# Patient Record
Sex: FEMALE | Race: WHITE | NOT HISPANIC OR LATINO | Employment: PART TIME | URBAN - METROPOLITAN AREA
[De-identification: names, ages, dates, MRNs, and addresses within clinical notes are randomized per-mention and may not be internally consistent; named-entity substitution may affect disease eponyms.]

---

## 2022-05-09 ENCOUNTER — OFFICE VISIT (OUTPATIENT)
Dept: FAMILY MEDICINE CLINIC | Facility: CLINIC | Age: 47
End: 2022-05-09
Payer: COMMERCIAL

## 2022-05-09 VITALS
DIASTOLIC BLOOD PRESSURE: 70 MMHG | TEMPERATURE: 99.4 F | WEIGHT: 178.6 LBS | HEART RATE: 96 BPM | HEIGHT: 63 IN | BODY MASS INDEX: 31.64 KG/M2 | SYSTOLIC BLOOD PRESSURE: 122 MMHG | OXYGEN SATURATION: 98 %

## 2022-05-09 DIAGNOSIS — G43.909 MIGRAINE WITHOUT STATUS MIGRAINOSUS, NOT INTRACTABLE, UNSPECIFIED MIGRAINE TYPE: Primary | ICD-10-CM

## 2022-05-09 PROCEDURE — 99203 OFFICE O/P NEW LOW 30 MIN: CPT | Performed by: FAMILY MEDICINE

## 2022-05-09 PROCEDURE — 3008F BODY MASS INDEX DOCD: CPT | Performed by: FAMILY MEDICINE

## 2022-05-09 PROCEDURE — 3725F SCREEN DEPRESSION PERFORMED: CPT | Performed by: FAMILY MEDICINE

## 2022-05-09 RX ORDER — RIZATRIPTAN BENZOATE 10 MG/1
10 TABLET ORAL ONCE AS NEEDED
COMMUNITY
Start: 2022-04-27 | End: 2023-04-27

## 2022-05-09 RX ORDER — MULTIVIT-MIN/IRON FUM/FOLIC AC 7.5 MG-4
1 TABLET ORAL DAILY
COMMUNITY

## 2022-05-09 NOTE — PROGRESS NOTES
Assessment/Plan:    1  Migraine without status migrainosus, not intractable, unspecified migraine type  Assessment & Plan:  Migraines are well controlled with Maxalt as needed  She may take ibuprofen as needed for mild headache symptoms  Subjective:      Patient ID: Izzy Hargrove is a 51 y o  female  HPI     Patient with PMhx of migraines presenting to [facility] care  She was seeing PCP at [facility] and had annual physical with labs completed 2021  She is overdue for her mammogram   She has a gynecologist and gets her mammograms done yearly  She is due for her next mammogram in the fall and has it scheduled already  Patient has 3 children, ages 15, 5 and 25  They were all born via   She is in perimenopause and still getting her periods  LMP was 1 month ago  Patient is currently working at [employer] as a  and enjoys her job  The patient gets a migraine once every other month  She takes maxalt as needed which helps her symptoms  She saw neurology when she was a child and was diagnosed with the migraines at that time  She takes ibuprofen as needed for regular headaches  She has no acute complaints today  The following portions of the patient's history were reviewed and updated as appropriate: allergies, current medications, past family history, past medical history, past social history, past surgical history, and problem list       Current Outpatient Medications:     Multiple Vitamins-Minerals (multivitamin with minerals) tablet, Take 1 tablet by mouth daily, Disp: , Rfl:     rizatriptan (MAXALT) 10 MG tablet, Take 10 mg by mouth once as needed  , Disp: , Rfl:       Review of Systems   Constitutional: Negative for activity change, appetite change, chills, fatigue and fever  HENT: Negative for congestion, ear discharge, ear pain, postnasal drip, rhinorrhea, sinus pressure, sinus pain, sneezing, sore throat and trouble swallowing      Eyes: Negative for pain, discharge, redness, itching and visual disturbance  Respiratory: Negative for apnea, cough, chest tightness, shortness of breath and wheezing  Cardiovascular: Negative for chest pain, palpitations and leg swelling  Gastrointestinal: Negative for abdominal distention, abdominal pain, blood in stool, constipation, diarrhea, nausea and vomiting  Endocrine: Negative for polyuria  Genitourinary: Negative for decreased urine volume, difficulty urinating, dyspareunia, dysuria, flank pain, frequency, hematuria, menstrual problem, pelvic pain, urgency, vaginal bleeding and vaginal discharge  Musculoskeletal: Negative for arthralgias, back pain, gait problem, joint swelling and myalgias  Skin: Negative for color change and rash  Neurological: Negative for dizziness, seizures, syncope, weakness, light-headedness, numbness and headaches  Psychiatric/Behavioral: Negative for behavioral problems and dysphoric mood  The patient is not nervous/anxious  All other systems reviewed and are negative  Objective:      /70 (BP Location: Left arm, Patient Position: Sitting, Cuff Size: Large)   Pulse 96   Temp 99 4 °F (37 4 °C) (Tympanic)   Ht 5' 3" (1 6 m)   Wt 81 kg (178 lb 9 6 oz)   SpO2 98%   BMI 31 64 kg/m²          Physical Exam  Vitals and nursing note reviewed  Constitutional:       General: She is not in acute distress  Appearance: Normal appearance  She is not toxic-appearing  HENT:      Head: Normocephalic and atraumatic  Right Ear: Tympanic membrane and external ear normal       Left Ear: Tympanic membrane and external ear normal       Nose: No congestion  Eyes:      Extraocular Movements: Extraocular movements intact  Conjunctiva/sclera: Conjunctivae normal       Pupils: Pupils are equal, round, and reactive to light  Neck:      Vascular: No carotid bruit  Cardiovascular:      Rate and Rhythm: Normal rate and regular rhythm  Heart sounds: Normal heart sounds  No murmur heard  Pulmonary:      Effort: Pulmonary effort is normal  No respiratory distress  Breath sounds: Normal breath sounds  No wheezing  Abdominal:      General: Abdomen is flat  Bowel sounds are normal       Palpations: Abdomen is soft  Tenderness: There is no abdominal tenderness  Musculoskeletal:         General: Normal range of motion  Cervical back: Normal range of motion  Lymphadenopathy:      Cervical: No cervical adenopathy  Skin:     General: Skin is warm and dry  Neurological:      General: No focal deficit present  Mental Status: She is alert and oriented to person, place, and time  Psychiatric:         Mood and Affect: Mood normal          Behavior: Behavior normal          Thought Content:  Thought content normal

## 2022-05-09 NOTE — ASSESSMENT & PLAN NOTE
Migraines are well controlled with Maxalt as needed  She may take ibuprofen as needed for mild headache symptoms

## 2022-05-10 ENCOUNTER — TELEPHONE (OUTPATIENT)
Dept: ADMINISTRATIVE | Facility: OTHER | Age: 47
End: 2022-05-10

## 2022-05-10 NOTE — TELEPHONE ENCOUNTER
----- Message from Shi Connell sent at 5/9/2022  4:17 PM EDT -----  Regarding: Care Gap Request  05/09/22 4:17 PM    Hello, our patient attached above has had Annual Physical completed/performed  Please assist in updating the patient chart by pulling the Care Everywhere (CE) document  The date of service is 2021       Thank you,  Keesha Heath MA  [facility]

## 2022-05-10 NOTE — TELEPHONE ENCOUNTER
Upon review of the In Basket request we were able to locate, review, and update the patient chart as requested for Mammogram     Any additional questions or concerns should be emailed to the Practice Liaisons via [email or web address] email, please do not reply via In Basket      Thank you  Hugo Albright

## 2022-05-10 NOTE — TELEPHONE ENCOUNTER
----- Message from Shi Ruiz sent at 5/9/2022  4:16 PM EDT -----  Regarding: Care Gap Request  05/09/22 4:16 PM    Hello, our patient attached above has had Mammogram completed/performed  Please assist in updating the patient chart by pulling the Care Everywhere (CE) document  The date of service is 2021       Thank you,  Keesha Heath MA  [facility]

## 2022-05-10 NOTE — TELEPHONE ENCOUNTER
Annual Physical  Upon review of the In Basket request we have noted this is a NON VALUE BASED item  We are unable to complete this request  Our team has the capability to assist in retrieval, updating, and linking of the following items: Chlamydia, CRC Cologuard, CRC Colonoscopy, CRC CT Colonography, CRC FIT/FOBT(3), CRC Sigmoidoscopy, CT Lung Screening, DEXA Scan, Diabetic Eye Exam, Diabetic Foot Exam, Hemoglobin A1c, Hepatitis C, HIV (cannot retrieve), Immunizations, Lead, Mammogram, Medicare AWV, Pap Smear (HPV),  and Urine Microalbumin  Any additional questions or concerns should be emailed to the Practice Liaisons via [email or web address] email, please do not reply via In Basket      Thank you  Hossein Rincon

## 2023-11-24 ENCOUNTER — TELEPHONE (OUTPATIENT)
Dept: FAMILY MEDICINE CLINIC | Facility: CLINIC | Age: 48
End: 2023-11-24

## 2023-11-29 NOTE — TELEPHONE ENCOUNTER
11/29/23 4:08 PM        Hello    There are not recent encounters in chart for Sarah Crabtree. Last visit in [address] [place] [address] is dated 2021. Unable to update PC field at this time.          Thank you  Franky Denney

## 2024-05-31 ENCOUNTER — TELEPHONE (OUTPATIENT)
Dept: FAMILY MEDICINE CLINIC | Facility: CLINIC | Age: 49
End: 2024-05-31

## 2024-06-21 ENCOUNTER — OFFICE VISIT (OUTPATIENT)
Dept: FAMILY MEDICINE CLINIC | Facility: CLINIC | Age: 49
End: 2024-06-21
Payer: COMMERCIAL

## 2024-06-21 VITALS
OXYGEN SATURATION: 97 % | TEMPERATURE: 97.5 F | DIASTOLIC BLOOD PRESSURE: 76 MMHG | BODY MASS INDEX: 32.07 KG/M2 | WEIGHT: 181 LBS | HEIGHT: 63 IN | SYSTOLIC BLOOD PRESSURE: 120 MMHG | HEART RATE: 101 BPM

## 2024-06-21 DIAGNOSIS — Z13.0 SCREENING FOR IRON DEFICIENCY ANEMIA: ICD-10-CM

## 2024-06-21 DIAGNOSIS — Z13.220 SCREENING FOR CHOLESTEROL LEVEL: ICD-10-CM

## 2024-06-21 DIAGNOSIS — Z12.11 SCREENING FOR COLON CANCER: ICD-10-CM

## 2024-06-21 DIAGNOSIS — Z13.1 SCREENING FOR DIABETES MELLITUS: ICD-10-CM

## 2024-06-21 DIAGNOSIS — Z12.31 ENCOUNTER FOR SCREENING MAMMOGRAM FOR MALIGNANT NEOPLASM OF BREAST: ICD-10-CM

## 2024-06-21 DIAGNOSIS — Z13.29 SCREENING FOR THYROID DISORDER: ICD-10-CM

## 2024-06-21 DIAGNOSIS — Z00.00 ANNUAL PHYSICAL EXAM: Primary | ICD-10-CM

## 2024-06-21 PROCEDURE — 99396 PREV VISIT EST AGE 40-64: CPT | Performed by: FAMILY MEDICINE

## 2024-06-21 NOTE — PROGRESS NOTES
"Adult Annual Physical  Name: Chiara Bellamy      : 1975      MRN: [id number]  Encounter Provider: Suzy Hinton DO  Encounter Date: 2024   Encounter department: [facility]    Assessment & Plan   1. Annual physical exam  Immunizations and preventive care screenings were discussed with patient today. Appropriate education was printed on patient's after visit summary.    Counseling:  Alcohol/drug use: discussed moderation in alcohol intake, the recommendations for healthy alcohol use, and avoidance of illicit drug use.  Dental Health: discussed importance of regular tooth brushing, flossing, and dental visits.  Injury prevention: discussed safety/seat belts, safety helmets, smoke detectors, carbon dioxide detectors, and smoking near bedding or upholstery.  Sexual health: discussed sexually transmitted diseases, partner selection, use of condoms, avoidance of unintended pregnancy, and contraceptive alternatives.  Exercise: the importance of regular exercise/physical activity was discussed. Recommend exercise 3-5 times per week for at least 30 minutes.          History of Present Illness     Adult Annual Physical:  Patient presents for annual physical.     Diet and Physical Activity:  - Diet/Nutrition: well balanced diet and limited junk food.  - Exercise: walking.    Depression Screening:  - PHQ-2 Score: 2    General Health:  - Sleep: sleeps well.  - Hearing: normal hearing bilateral ears.  - Vision: goes for regular eye exams.  - Dental: regular dental visits.    /GYN Health:  - Follows with GYN: yes.   - Menopause: postmenopausal.     Review of Systems      Objective     /76 (BP Location: Left arm, Patient Position: Sitting, Cuff Size: Large)   Pulse 101   Temp 97.5 °F (36.4 °C) (Temporal)   Ht 5' 3\" (1.6 m)   Wt 82.1 kg (181 lb)   SpO2 97%   BMI 32.06 kg/m²     Physical Exam  Administrative Statements         "

## 2024-07-03 ENCOUNTER — APPOINTMENT (OUTPATIENT)
Dept: LAB | Facility: CLINIC | Age: 49
End: 2024-07-03
Payer: COMMERCIAL

## 2024-07-03 DIAGNOSIS — Z13.1 SCREENING FOR DIABETES MELLITUS: ICD-10-CM

## 2024-07-03 DIAGNOSIS — Z13.0 SCREENING FOR IRON DEFICIENCY ANEMIA: ICD-10-CM

## 2024-07-03 DIAGNOSIS — Z13.220 SCREENING FOR CHOLESTEROL LEVEL: ICD-10-CM

## 2024-07-03 DIAGNOSIS — Z13.29 SCREENING FOR THYROID DISORDER: ICD-10-CM

## 2024-07-03 LAB
ALBUMIN SERPL BCG-MCNC: 4.7 G/DL (ref 3.5–5)
ALP SERPL-CCNC: 69 U/L (ref 34–104)
ALT SERPL W P-5'-P-CCNC: 11 U/L (ref 7–52)
ANION GAP SERPL CALCULATED.3IONS-SCNC: 8 MMOL/L (ref 4–13)
AST SERPL W P-5'-P-CCNC: 13 U/L (ref 13–39)
BILIRUB SERPL-MCNC: 0.53 MG/DL (ref 0.2–1)
BUN SERPL-MCNC: 10 MG/DL (ref 5–25)
CALCIUM SERPL-MCNC: 10 MG/DL (ref 8.4–10.2)
CHLORIDE SERPL-SCNC: 104 MMOL/L (ref 96–108)
CHOLEST SERPL-MCNC: 173 MG/DL
CO2 SERPL-SCNC: 28 MMOL/L (ref 21–32)
CREAT SERPL-MCNC: 0.62 MG/DL (ref 0.6–1.3)
ERYTHROCYTE [DISTWIDTH] IN BLOOD BY AUTOMATED COUNT: 13.2 % (ref 11.6–15.1)
EST. AVERAGE GLUCOSE BLD GHB EST-MCNC: 123 MG/DL
GFR SERPL CREATININE-BSD FRML MDRD: 106 ML/MIN/1.73SQ M
GLUCOSE P FAST SERPL-MCNC: 90 MG/DL (ref 65–99)
HBA1C MFR BLD: 5.9 %
HCT VFR BLD AUTO: 41.1 % (ref 34.8–46.1)
HDLC SERPL-MCNC: 50 MG/DL
HGB BLD-MCNC: 13.4 G/DL (ref 11.5–15.4)
LDLC SERPL CALC-MCNC: 99 MG/DL (ref 0–100)
MCH RBC QN AUTO: 29.5 PG (ref 26.8–34.3)
MCHC RBC AUTO-ENTMCNC: 32.6 G/DL (ref 31.4–37.4)
MCV RBC AUTO: 91 FL (ref 82–98)
PLATELET # BLD AUTO: 330 THOUSANDS/UL (ref 149–390)
PMV BLD AUTO: 8.9 FL (ref 8.9–12.7)
POTASSIUM SERPL-SCNC: 4.5 MMOL/L (ref 3.5–5.3)
PROT SERPL-MCNC: 7.4 G/DL (ref 6.4–8.4)
RBC # BLD AUTO: 4.54 MILLION/UL (ref 3.81–5.12)
SODIUM SERPL-SCNC: 140 MMOL/L (ref 135–147)
TRIGL SERPL-MCNC: 120 MG/DL
TSH SERPL DL<=0.05 MIU/L-ACNC: 2.07 UIU/ML (ref 0.45–4.5)
WBC # BLD AUTO: 8.08 THOUSAND/UL (ref 4.31–10.16)

## 2024-07-03 PROCEDURE — 84443 ASSAY THYROID STIM HORMONE: CPT

## 2024-07-03 PROCEDURE — 80061 LIPID PANEL: CPT

## 2024-07-03 PROCEDURE — 36415 COLL VENOUS BLD VENIPUNCTURE: CPT

## 2024-07-03 PROCEDURE — 80053 COMPREHEN METABOLIC PANEL: CPT

## 2024-07-03 PROCEDURE — 83036 HEMOGLOBIN GLYCOSYLATED A1C: CPT

## 2024-07-03 PROCEDURE — 85027 COMPLETE CBC AUTOMATED: CPT

## 2024-10-16 ENCOUNTER — OFFICE VISIT (OUTPATIENT)
Dept: URGENT CARE | Facility: CLINIC | Age: 49
End: 2024-10-16
Payer: COMMERCIAL

## 2024-10-16 VITALS
BODY MASS INDEX: 31.89 KG/M2 | TEMPERATURE: 98.7 F | OXYGEN SATURATION: 99 % | RESPIRATION RATE: 17 BRPM | HEART RATE: 90 BPM | DIASTOLIC BLOOD PRESSURE: 72 MMHG | WEIGHT: 180 LBS | SYSTOLIC BLOOD PRESSURE: 122 MMHG

## 2024-10-16 DIAGNOSIS — J01.90 ACUTE SINUSITIS, RECURRENCE NOT SPECIFIED, UNSPECIFIED LOCATION: Primary | ICD-10-CM

## 2024-10-16 PROCEDURE — G0382 LEV 3 HOSP TYPE B ED VISIT: HCPCS | Performed by: PHYSICIAN ASSISTANT

## 2024-10-16 PROCEDURE — S9083 URGENT CARE CENTER GLOBAL: HCPCS | Performed by: PHYSICIAN ASSISTANT

## 2024-10-16 RX ORDER — CEFDINIR 300 MG/1
300 CAPSULE ORAL EVERY 12 HOURS SCHEDULED
Qty: 14 CAPSULE | Refills: 0 | Status: SHIPPED | OUTPATIENT
Start: 2024-10-16 | End: 2024-10-23

## 2024-10-16 RX ORDER — METHYLPREDNISOLONE 4 MG/1
TABLET ORAL
Qty: 1 EACH | Refills: 0 | Status: SHIPPED | OUTPATIENT
Start: 2024-10-16

## 2024-10-16 NOTE — PATIENT INSTRUCTIONS
Provider wondering about possible bacterial sinus infection.  Will treat with antibiotic.  Take as instructed.    Will also give low-dose prednisone which is a steroid to try and decrease inflammation and irritation of the sinus and bronchial passages.  Recommend not starting that until tomorrow morning as could interfere with sleep.    May benefit from using products such as Nasacort AQ or generic equivalent.  This is over-the-counter.  Would recommend nightly for the next 2 to 3 weeks.    You may continue your DayQuil and/or NyQuil for comfort as needed.    Follow-up with primary care if not slowly improving over the next 7 to 10 days.

## 2024-10-16 NOTE — PROGRESS NOTES
[facility]    NAME: Chiara Bellamy is a 48 y.o. female  : 1975    MRN: [id number]  DATE: 2024  TIME: 4:31 PM    Assessment and Plan   Acute sinusitis, recurrence not specified, unspecified location [J01.90]  1. Acute sinusitis, recurrence not specified, unspecified location  cefdinir (OMNICEF) 300 mg capsule    methylPREDNISolone 4 MG tablet therapy pack          Patient Instructions     Patient Instructions   Provider wondering about possible bacterial sinus infection.  Will treat with antibiotic.  Take as instructed.    Will also give low-dose prednisone which is a steroid to try and decrease inflammation and irritation of the sinus and bronchial passages.  Recommend not starting that until tomorrow morning as could interfere with sleep.    May benefit from using products such as Nasacort AQ or generic equivalent.  This is over-the-counter.  Would recommend nightly for the next 2 to 3 weeks.    You may continue your DayQuil and/or NyQuil for comfort as needed.    Follow-up with primary care if not slowly improving over the next 7 to 10 days.    Chief Complaint     Chief Complaint   Patient presents with    Cold Like Symptoms     2 weeks of cold like s/s. S/s not going away. Coughing, sneezing, eye pressure, wheezing at night, lethargic. Denies any fevers. Pt unsure of sick contacts. COVID test done at home, was negative.       History of Present Illness   Chiara Bellamy presents to the clinic c/o  48-year-old female comes in with coughing sneezing eye pain and pressure.    Started: 2 weeks ago.  Associated signs and symptoms: No fever or chills but tired.  Definitely postnasal drip.  Some wheezy sensation in chest.  No chest tightness.  Modifying factors: Did home COVID test that was negative.  Has been using DayQuil and NyQuil.  Used a few nights of Afrin.  Does not tolerate Sudafed.  Known Exposures: None known.  Works at [employer].  Recent travel:  No.  Hx asthma:  No.  Hx  pneumonia:  No.  Smoker: No.        Review of Systems   Review of Systems   Constitutional:  Positive for activity change and fatigue. Negative for appetite change, chills, diaphoresis and fever.   HENT:  Positive for congestion, postnasal drip, rhinorrhea, sinus pressure, sinus pain and sneezing. Negative for ear discharge, ear pain and sore throat.    Eyes: Negative.    Respiratory:  Positive for cough and wheezing. Negative for chest tightness and shortness of breath.    Cardiovascular: Negative.    Hematological: Negative.        Current Medications     Long-Term Medications   Medication Sig Dispense Refill    Multiple Vitamins-Minerals (multivitamin with minerals) tablet Take 1 tablet by mouth daily      rizatriptan (MAXALT) 10 MG tablet Take 10 mg by mouth once as needed           Current Allergies     Allergies as of 10/16/2024 - Reviewed 10/16/2024   Allergen Reaction Noted    Penicillins Itching 2022          The following portions of the patient's history were reviewed and updated as appropriate: allergies, current medications, past family history, past medical history, past social history, past surgical history and problem list.  Past Medical History:   Diagnosis Date    Migraine      Past Surgical History:   Procedure Laterality Date     SECTION      3     Family History   Problem Relation Age of Onset    COPD Mother     No Known Problems Father        Objective   /72   Pulse 90   Temp 98.7 °F (37.1 °C) (Tympanic)   Resp 17   Wt 81.6 kg (180 lb)   SpO2 99%   BMI 31.89 kg/m²   No LMP recorded. Patient is postmenopausal.       Physical Exam     Physical Exam  Vitals and nursing note reviewed.   Constitutional:       General: She is not in acute distress.     Appearance: She is well-developed. She is ill-appearing. She is not toxic-appearing or diaphoretic.      Comments: Appears mildly ill but in no acute distress.  No trismus or conversational dyspnea.  Occasional dry cough    HENT:      Head: Normocephalic and atraumatic.      Comments: Maxillary and frontal sinus TTP bilaterally     Right Ear: Tympanic membrane, ear canal and external ear normal.      Left Ear: Tympanic membrane, ear canal and external ear normal.      Nose: Congestion present. No rhinorrhea.      Mouth/Throat:      Mouth: Mucous membranes are moist.      Pharynx: Posterior oropharyngeal erythema present. No oropharyngeal exudate.      Comments: Cobblestoning posterior pharynx with patchy redness  Eyes:      General:         Right eye: No discharge.         Left eye: No discharge.      Conjunctiva/sclera: Conjunctivae normal.      Pupils: Pupils are equal, round, and reactive to light.   Cardiovascular:      Rate and Rhythm: Normal rate and regular rhythm.      Heart sounds: Normal heart sounds. No murmur heard.     No friction rub. No gallop.   Pulmonary:      Effort: Pulmonary effort is normal. No respiratory distress.      Breath sounds: Normal breath sounds. No stridor. No wheezing, rhonchi or rales.   Musculoskeletal:      Cervical back: Normal range of motion and neck supple. No rigidity or tenderness.   Lymphadenopathy:      Cervical: No cervical adenopathy.   Skin:     General: Skin is warm and dry.      Coloration: Skin is not jaundiced or pale.      Findings: No rash.   Neurological:      Mental Status: She is alert and oriented to person, place, and time.   Psychiatric:         Mood and Affect: Mood normal.         Behavior: Behavior normal.

## 2024-10-16 NOTE — LETTER
October 16, 2024     Patient: Chiara Bellamy   YOB: 1975   Date of Visit: 10/16/2024       To Whom It May Concern:    Patient seen in office today for acute medical ailment.  May attempt return to work in the next 1 to 3 days as tolerated.         Sincerely,        Yasmeen Denis PA-C    CC: No Recipients

## 2024-11-11 ENCOUNTER — HOSPITAL ENCOUNTER (OUTPATIENT)
Dept: MAMMOGRAPHY | Facility: MEDICAL CENTER | Age: 49
Discharge: HOME | End: 2024-11-11
Payer: COMMERCIAL

## 2024-11-11 VITALS — BODY MASS INDEX: 31.89 KG/M2 | HEIGHT: 63 IN | WEIGHT: 180 LBS

## 2024-11-11 DIAGNOSIS — Z12.31 ENCOUNTER FOR SCREENING MAMMOGRAM FOR MALIGNANT NEOPLASM OF BREAST: ICD-10-CM

## 2024-11-11 PROCEDURE — 77067 SCR MAMMO BI INCL CAD: CPT

## 2024-11-11 PROCEDURE — 77063 BREAST TOMOSYNTHESIS BI: CPT

## 2025-03-20 ENCOUNTER — APPOINTMENT (OUTPATIENT)
Dept: RADIOLOGY | Facility: CLINIC | Age: 50
End: 2025-03-20
Payer: COMMERCIAL

## 2025-03-20 ENCOUNTER — OFFICE VISIT (OUTPATIENT)
Dept: FAMILY MEDICINE CLINIC | Facility: CLINIC | Age: 50
End: 2025-03-20
Payer: COMMERCIAL

## 2025-03-20 VITALS
SYSTOLIC BLOOD PRESSURE: 132 MMHG | DIASTOLIC BLOOD PRESSURE: 82 MMHG | BODY MASS INDEX: 32.49 KG/M2 | WEIGHT: 183.4 LBS | TEMPERATURE: 98.5 F

## 2025-03-20 DIAGNOSIS — M25.512 ACUTE PAIN OF LEFT SHOULDER: Primary | ICD-10-CM

## 2025-03-20 DIAGNOSIS — M25.512 ACUTE PAIN OF LEFT SHOULDER: ICD-10-CM

## 2025-03-20 PROCEDURE — 99214 OFFICE O/P EST MOD 30 MIN: CPT | Performed by: FAMILY MEDICINE

## 2025-03-20 PROCEDURE — 96372 THER/PROPH/DIAG INJ SC/IM: CPT

## 2025-03-20 PROCEDURE — 73030 X-RAY EXAM OF SHOULDER: CPT

## 2025-03-20 RX ORDER — PREDNISONE 10 MG/1
TABLET ORAL
Qty: 21 TABLET | Refills: 0 | Status: SHIPPED | OUTPATIENT
Start: 2025-03-20

## 2025-03-20 RX ORDER — DEXAMETHASONE SODIUM PHOSPHATE 4 MG/ML
6 INJECTION, SOLUTION INTRA-ARTICULAR; INTRALESIONAL; INTRAMUSCULAR; INTRAVENOUS; SOFT TISSUE ONCE
Status: COMPLETED | OUTPATIENT
Start: 2025-03-20 | End: 2025-03-20

## 2025-03-20 RX ORDER — TRAMADOL HYDROCHLORIDE 50 MG/1
50 TABLET ORAL EVERY 12 HOURS PRN
Qty: 20 TABLET | Refills: 0 | Status: SHIPPED | OUTPATIENT
Start: 2025-03-20

## 2025-03-20 RX ADMIN — DEXAMETHASONE SODIUM PHOSPHATE 6 MG: 4 INJECTION, SOLUTION INTRA-ARTICULAR; INTRALESIONAL; INTRAMUSCULAR; INTRAVENOUS; SOFT TISSUE at 11:23

## 2025-03-20 NOTE — PROGRESS NOTES
Name: Chiara Bellamy      : 1975      MRN: [id number]  Encounter Provider: Suzy Hinton DO  Encounter Date: 3/20/2025   Encounter department: [facility]  :  Assessment & Plan  Acute pain of left shoulder  Symptoms appear concerning for possible rotator cuff strain.  At this time, will check x-ray rule out gross abnormalities.  She was advised that she would highly benefit from physical therapy.  Patient will administer IM Decadron today.  Will follow this with a prednisone taper.  She may take tramadol for her severe pain relief.  Follow-up if any symptoms persist.  Orders:    XR shoulder 2+ vw left; Future    dexamethasone (DECADRON) injection 6 mg    predniSONE 10 mg tablet; Take 6 tablets By mouth  In the morning Qd.   Decrease by  By 1 tablet daily until completed.    Ambulatory Referral to Physical Therapy; Future    traMADol (Ultram) 50 mg tablet; Take 1 tablet (50 mg total) by mouth every 12 (twelve) hours as needed for moderate pain           History of Present Illness   Shoulder Pain   The pain is present in the left shoulder. This is a new problem. Episode onset: 4 days ago. The problem occurs constantly. The problem has been unchanged. The quality of the pain is described as sharp. The pain is at a severity of 5/10. The pain is moderate. Associated symptoms include a limited range of motion and stiffness. Pertinent negatives include no fever, numbness or tingling. She has tried NSAIDS and OTC pain meds for the symptoms.     Review of Systems   Constitutional:  Negative for activity change, chills, fatigue and fever.   HENT:  Negative for congestion, ear pain, sinus pressure and sore throat.    Eyes:  Negative for redness, itching and visual disturbance.   Respiratory:  Negative for cough and shortness of breath.    Cardiovascular:  Negative for chest pain and palpitations.   Gastrointestinal:  Negative for abdominal pain, diarrhea and nausea.   Endocrine: Negative for  cold intolerance and heat intolerance.   Genitourinary:  Negative for dysuria, flank pain and frequency.   Musculoskeletal:  Positive for arthralgias and stiffness. Negative for back pain, gait problem and myalgias.   Skin:  Negative for color change.   Allergic/Immunologic: Negative for environmental allergies.   Neurological:  Negative for dizziness, tingling, numbness and headaches.   Psychiatric/Behavioral:  Negative for behavioral problems and sleep disturbance.        Objective   /82 (BP Location: Right arm, Patient Position: Sitting, Cuff Size: Adult)   Temp 98.5 °F (36.9 °C)   Wt 83.2 kg (183 lb 6.4 oz)   BMI 32.49 kg/m²      Physical Exam  Vitals reviewed.   Constitutional:       General: She is not in acute distress.     Appearance: Normal appearance. She is well-developed. She is not ill-appearing.   HENT:      Head: Normocephalic and atraumatic.      Right Ear: Hearing and external ear normal.      Left Ear: Hearing and external ear normal.      Nose: Nose normal. No nasal deformity or septal deviation.      Mouth/Throat:      Mouth: Mucous membranes are moist.      Pharynx: Oropharynx is clear.   Eyes:      General: Lids are normal.      Extraocular Movements: Extraocular movements intact.      Conjunctiva/sclera: Conjunctivae normal.      Pupils: Pupils are equal, round, and reactive to light.   Neck:      Thyroid: No thyromegaly.      Trachea: Trachea normal.   Cardiovascular:      Rate and Rhythm: Normal rate.   Pulmonary:      Effort: Pulmonary effort is normal. No respiratory distress.   Abdominal:      General: There is no distension.      Tenderness: There is no guarding.   Musculoskeletal:      Left shoulder: Tenderness present. Decreased range of motion.      Cervical back: Normal range of motion and neck supple. No edema or erythema.      Comments: Positive drop arm, empty can sign, Ley sign.   Skin:     General: Skin is warm and dry.   Neurological:      Mental Status: She is  alert.      Cranial Nerves: No cranial nerve deficit.      Sensory: No sensory deficit.   Psychiatric:         Speech: Speech normal.         Behavior: Behavior normal. Behavior is cooperative.         Thought Content: Thought content normal.         Judgment: Judgment normal.

## 2025-03-23 ENCOUNTER — RESULTS FOLLOW-UP (OUTPATIENT)
Dept: FAMILY MEDICINE CLINIC | Facility: CLINIC | Age: 50
End: 2025-03-23

## 2025-03-24 DIAGNOSIS — M25.512 ACUTE PAIN OF LEFT SHOULDER: Primary | ICD-10-CM

## 2025-03-24 RX ORDER — ACETAMINOPHEN AND CODEINE PHOSPHATE 300; 30 MG/1; MG/1
1 TABLET ORAL EVERY 8 HOURS PRN
Qty: 21 TABLET | Refills: 0 | Status: SHIPPED | OUTPATIENT
Start: 2025-03-24

## 2025-03-26 ENCOUNTER — EVALUATION (OUTPATIENT)
Dept: PHYSICAL THERAPY | Facility: CLINIC | Age: 50
End: 2025-03-26
Payer: COMMERCIAL

## 2025-03-26 DIAGNOSIS — M25.512 ACUTE PAIN OF LEFT SHOULDER: Primary | ICD-10-CM

## 2025-03-26 PROCEDURE — 97140 MANUAL THERAPY 1/> REGIONS: CPT

## 2025-03-26 PROCEDURE — 97161 PT EVAL LOW COMPLEX 20 MIN: CPT

## 2025-03-26 NOTE — PROGRESS NOTES
PT Evaluation     Today's date: 3/26/2025  Patient name: Chiara Bellamy  : 1975  MRN: [id number]  Referring provider: Suzy Hinton DO  Dx:   Encounter Diagnosis     ICD-10-CM    1. Acute pain of left shoulder  M25.512 Ambulatory Referral to Physical Therapy                     Assessment  Impairments: abnormal or restricted ROM, activity intolerance, impaired balance, impaired physical strength, lacks appropriate home exercise program, pain with function, scapular dyskinesis, poor posture , participation limitations, activity limitations and endurance  Symptom irritability: low    Assessment details: Pt is a 49 y.o. year old female that presents to outpatient physical therapy with acute Left shoulder pain.  Pt demonstrates signs and symptoms that point to possible rotator cuff tendonitis vs strain vs tear. Pt exhibited limited AROM with shoulder elevation when reaching over head. Pt also expressed decreased pain and greater motion with PROM. Pt demonstrates increased pain, decreased ROM, decreased strength, decreased activity tolerance, and decreased functional activity such as donning/doffing clothing, reaching overhead and lifting due to pain. Pt appears motivated; HEP was reviewed and given to patient. Pt would benefit from skilled physical therapy to address noted impairments, meet patient's goals, and to return to PLOF.   Thank you for this referral.    Understanding of Dx/Px/POC: good     Prognosis: good    Goals  STGs:   1. Pt will be able to demonstrate an increase of strength by at least 1/2 grade within 4 weeks   2. Pt will be able to achieve increased ROM by at least 25% within 4 weeks.   3. Pt will be able to report pain less than 6/10 at worse within 4 weeks.  4. Pt will be able to reach overhead without increase of shoulder elevation without cueing within 4 weeks.     LTGs:   1. Pt will be independent with all IADLs without pain or discomfort upon discharge.   2. Pt will be independent  with HEP upon discharge   3. Pt will be able to report no pain or discomfort with all work duties and recreational activities for a full day upon discharge   4. Pt will be able to lift at least 10 lbs overhead at least one time without increase of symptoms or compensations upon discharge     Plan  Patient would benefit from: PT eval and skilled physical therapy  Planned modality interventions: low level laser therapy, TENS, thermotherapy: hydrocollator packs, ultrasound, iontophoresis, cryotherapy and electrical stimulation/Russian stimulation    Planned therapy interventions: abdominal trunk stabilization, breathing training, flexibility, functional ROM exercises, home exercise program, therapeutic training, therapeutic exercise, therapeutic activities, stretching, strengthening, postural training, patient education, neuromuscular re-education, muscle pump exercises, massage, manual therapy, IADL retraining, joint mobilization and Huynh taping    Frequency: 1-2x week  Treatment plan discussed with: patient        Subjective Evaluation    History of Present Illness  Mechanism of injury: Pt states that she has been having L shoulder pain/discomfort for the past 1-2 weeks. Reports that she was picking up and moving larger rocks in her yard 2 weekends ago which resulted in some shoulder fatigue, then soreness, followed up significant increase of pain about 4-5 days later. Reports that she had to stop working due to the level of pain she has been in and went to her PCP. Was given some medication without to much relief (prednisone helped the most)  Social involvement: coffee       Denies changes in bowel/bladder. Denies saddle anesthesia. Denies numbness/tingling. Denies neck pain  VBI: (-) Diplopia, (-) Dizziness, (-) Dysphagia, (-) Dysarthria, (-) Drop attacks, (-) Nausea, (-) Vomiting, (-) Sensory changes, (-) Nystagmus       AGGS: reaching, lifting, doning/doffing clothing, sleeping flat or on side,  "  EASES: rest, medication   Goals: decrease pain, increase motion and strength, return to work again      Imaging findings: L shoulder Xray - IMPRESSION:     No acute osseous abnormality    Patient Goals  Patient goals for therapy: decreased pain, increased motion, independence with ADLs/IADLs, return to sport/leisure activities, return to work and increased strength    Pain  Current pain ratin  At best pain ratin  At worst pain ratin  Quality: dull ache, sharp and discomfort    Social Support  Lives with: spouse    Employment status: working  Hand dominance: right    Treatments  Current treatment: medication        Objective     Neurological Testing     Sensation     Shoulder   Left Shoulder   Intact: light touch    Right Shoulder   Intact: Light touch    Active Range of Motion   Left Shoulder   Flexion: 47 degrees with pain  Extension: 50 degrees with pain  External rotation 0°: 7 degrees with pain  Internal rotation 0°: WFL and with pain    Passive Range of Motion   Left Shoulder   Flexion: 69 degrees with pain  Abduction: 76 degrees with pain  External rotation 45°: 6 degrees with pain    Strength/Myotome Testing     Additional Strength Details  Will assess when pain levels permit     Tests     Additional Tests Details  Will assess when pain levels permit              Precautions: low risk    Daily Treatment Diary    Date 3/26            FOTO IE -             Re-Eval IE               Manuals    Shoulder PROM JM            Post/Inf GH mob                                                                 Neuro Re-Ed     Seated Scap retraction                                                                                           Ther Ex    Arm bike  Hold for now            Pulleys             Shoulder ISO  Flex, abd, ER 3\" hold 10x ea            Seated shoulder Flex AAROM 10x             Seated shoulder abd AAROM 10x            Supine AAROM with stick - bench press                                     "   UT stretch                                                                                                         Ther Activity                              Gait Training                              Modalities    Ice and E-STIM post treatment PERF - 10 mins

## 2025-03-31 ENCOUNTER — OFFICE VISIT (OUTPATIENT)
Dept: PHYSICAL THERAPY | Facility: CLINIC | Age: 50
End: 2025-03-31
Payer: COMMERCIAL

## 2025-03-31 DIAGNOSIS — M25.512 ACUTE PAIN OF LEFT SHOULDER: Primary | ICD-10-CM

## 2025-03-31 PROCEDURE — 97110 THERAPEUTIC EXERCISES: CPT

## 2025-03-31 PROCEDURE — 97140 MANUAL THERAPY 1/> REGIONS: CPT

## 2025-03-31 PROCEDURE — 97112 NEUROMUSCULAR REEDUCATION: CPT

## 2025-03-31 NOTE — PROGRESS NOTES
"Daily Note     Today's date: 3/31/2025  Patient name: Chiara Bellamy  : 1975  MRN: [id number]  Referring provider: Suzy Hinton DO  Dx:   Encounter Diagnosis     ICD-10-CM    1. Acute pain of left shoulder  M25.512                      Subjective: Pt states that she feels that she is on the right track. Feels that her shoulder is progressing well, but still can not 'push' it to much do to pain.       Objective: See treatment diary below      Assessment: Tolerated treatment well. Manual techniques were performed to increase shoulder PROM in all planes. Reported increase of discomfort with eccentric control, even with PROM. Additional AAROM activities and exercises were also performed today and added to HEP. Progressions to HEP were also given to patient to progress as tolerance Patient demonstrated fatigue post treatment and would benefit from continued PT      Plan: Continue per plan of care.  Will follow up with patient in 2 weeks to reassess and progress. Advised patient to continue with home exercise program which I reviewed with them today. Advised patient to contact me with any questions or concerns regarding exercise or symptoms over the next 2 weeks. Pt is in agreement with plan.       Precautions: low risk    Daily Treatment Diary    Date 3/26 3/31           FOTO IE -             Re-Eval IE               Manuals    Shoulder PROM JM JM           Post/Inf GH mob                                                                 Neuro Re-Ed     Seated Scap retraction  2x10           Standing shoulder Rows  Green TB 2x10           Standing shoulder ext  Yellow TB 2x10                                                                Ther Ex    Arm bike  Hold for now            Pulleys  3 mins           Shoulder ISO  Flex, abd, ER 3\" hold 10x ea            Seated shoulder Flex AAROM 10x             Seated shoulder abd AAROM 10x Standing with stick 10x HEP          Supine AAROM with stick - bench press  " 10x HEP          Supine AAROM with stick - ER  10x HEP          Standing shoulder ext with stick  2x10 HEP                                                                                                                   Ther Activity                              Gait Training                              Modalities    Ice and E-STIM post treatment PERF - 10 mins